# Patient Record
Sex: FEMALE | Race: WHITE
[De-identification: names, ages, dates, MRNs, and addresses within clinical notes are randomized per-mention and may not be internally consistent; named-entity substitution may affect disease eponyms.]

---

## 2019-07-27 ENCOUNTER — HOSPITAL ENCOUNTER (EMERGENCY)
Dept: HOSPITAL 96 - M.ERS | Age: 44
Discharge: HOME | End: 2019-07-27
Payer: COMMERCIAL

## 2019-07-27 VITALS — SYSTOLIC BLOOD PRESSURE: 96 MMHG | DIASTOLIC BLOOD PRESSURE: 55 MMHG

## 2019-07-27 VITALS — HEIGHT: 62 IN | WEIGHT: 125 LBS | BODY MASS INDEX: 23 KG/M2

## 2019-07-27 DIAGNOSIS — Z88.6: ICD-10-CM

## 2019-07-27 DIAGNOSIS — K57.30: ICD-10-CM

## 2019-07-27 DIAGNOSIS — Z98.890: ICD-10-CM

## 2019-07-27 DIAGNOSIS — Z90.711: ICD-10-CM

## 2019-07-27 DIAGNOSIS — F41.9: ICD-10-CM

## 2019-07-27 DIAGNOSIS — F32.9: ICD-10-CM

## 2019-07-27 DIAGNOSIS — Z90.49: ICD-10-CM

## 2019-07-27 DIAGNOSIS — N39.0: Primary | ICD-10-CM

## 2019-07-27 DIAGNOSIS — G43.909: ICD-10-CM

## 2019-07-27 LAB
ABSOLUTE BASOPHILS: 0 THOU/UL (ref 0–0.2)
ABSOLUTE EOSINOPHILS: 0 THOU/UL (ref 0–0.7)
ABSOLUTE MONOCYTES: 0.5 THOU/UL (ref 0–1.2)
ALBUMIN SERPL-MCNC: 4.6 G/DL (ref 3.4–5)
ALP SERPL-CCNC: 93 U/L (ref 46–116)
ALT SERPL-CCNC: 23 U/L (ref 30–65)
ANION GAP SERPL CALC-SCNC: 11 MMOL/L (ref 7–16)
AST SERPL-CCNC: 18 U/L (ref 15–37)
BACTERIA-REFLEX: (no result) /HPF
BASOPHILS NFR BLD AUTO: 0.4 %
BILIRUB SERPL-MCNC: 0.4 MG/DL
BILIRUB UR-MCNC: NEGATIVE MG/DL
BUN SERPL-MCNC: 9 MG/DL (ref 7–18)
CALCIUM SERPL-MCNC: 9.7 MG/DL (ref 8.5–10.1)
CHLORIDE SERPL-SCNC: 102 MMOL/L (ref 98–107)
CO2 SERPL-SCNC: 28 MMOL/L (ref 21–32)
COLOR UR: YELLOW
CREAT SERPL-MCNC: 1 MG/DL (ref 0.6–1.3)
EOSINOPHIL NFR BLD: 0.4 %
GLUCOSE SERPL-MCNC: 91 MG/DL (ref 70–99)
GRANULOCYTES NFR BLD MANUAL: 74.9 %
HCT VFR BLD CALC: 42 % (ref 37–47)
HGB BLD-MCNC: 14.1 GM/DL (ref 12–15)
KETONES UR STRIP-MCNC: (no result) MG/DL
LIPASE: 95 U/L (ref 73–393)
LYMPHOCYTES # BLD: 1.3 THOU/UL (ref 0.8–5.3)
LYMPHOCYTES NFR BLD AUTO: 17.8 %
MCH RBC QN AUTO: 28.2 PG (ref 26–34)
MCHC RBC AUTO-ENTMCNC: 33.6 G/DL (ref 28–37)
MCV RBC: 84 FL (ref 80–100)
MONOCYTES NFR BLD: 6.5 %
MPV: 8.8 FL. (ref 7.2–11.1)
MUCUS: (no result) STRN/LPF
NEUTROPHILS # BLD: 5.5 THOU/UL (ref 1.6–8.1)
NUCLEATED RBCS: 0 /100WBC
PLATELET COUNT*: 333 THOU/UL (ref 150–400)
POTASSIUM SERPL-SCNC: 4 MMOL/L (ref 3.5–5.1)
PROT SERPL-MCNC: 8.3 G/DL (ref 6.4–8.2)
PROT UR QL STRIP: NEGATIVE
RBC # BLD AUTO: 5.01 MIL/UL (ref 4.2–5)
RBC # UR STRIP: (no result) /UL
RDW-CV: 14.1 % (ref 10.5–14.5)
SODIUM SERPL-SCNC: 141 MMOL/L (ref 136–145)
SP GR UR STRIP: 1.01 (ref 1–1.03)
SQUAMOUS: (no result) /LPF (ref 0–3)
URINE CLARITY: (no result)
URINE GLUCOSE-RANDOM: NEGATIVE
URINE LEUKOCYTES-REFLEX: (no result)
URINE NITRITE-REFLEX: POSITIVE
URINE WBC-REFLEX: (no result) /HPF (ref 0–5)
UROBILINOGEN UR STRIP-ACNC: 0.2 E.U./DL (ref 0.2–1)
WBC # BLD AUTO: 7.3 THOU/UL (ref 4–11)

## 2020-11-07 ENCOUNTER — HOSPITAL ENCOUNTER (EMERGENCY)
Dept: HOSPITAL 35 - ER | Age: 45
LOS: 1 days | Discharge: HOME | End: 2020-11-08
Payer: COMMERCIAL

## 2020-11-07 VITALS — WEIGHT: 133 LBS | HEIGHT: 62 IN | BODY MASS INDEX: 24.48 KG/M2

## 2020-11-07 DIAGNOSIS — Z88.8: ICD-10-CM

## 2020-11-07 DIAGNOSIS — R11.2: Primary | ICD-10-CM

## 2020-11-07 DIAGNOSIS — Z90.49: ICD-10-CM

## 2020-11-07 DIAGNOSIS — J02.9: ICD-10-CM

## 2020-11-07 DIAGNOSIS — Z20.828: ICD-10-CM

## 2020-11-07 DIAGNOSIS — K21.9: ICD-10-CM

## 2020-11-07 DIAGNOSIS — J06.9: ICD-10-CM

## 2020-11-07 DIAGNOSIS — Z79.899: ICD-10-CM

## 2020-11-07 DIAGNOSIS — G43.909: ICD-10-CM

## 2020-11-07 DIAGNOSIS — R10.11: ICD-10-CM

## 2020-11-08 VITALS — DIASTOLIC BLOOD PRESSURE: 58 MMHG | SYSTOLIC BLOOD PRESSURE: 100 MMHG

## 2020-11-08 LAB
ALBUMIN SERPL-MCNC: 3.8 G/DL (ref 3.4–5)
ALT SERPL-CCNC: 29 U/L (ref 30–65)
ANION GAP SERPL CALC-SCNC: 7 MMOL/L (ref 7–16)
AST SERPL-CCNC: 25 U/L (ref 15–37)
BASOPHILS NFR BLD AUTO: 0.5 % (ref 0–2)
BILIRUB SERPL-MCNC: 0.2 MG/DL (ref 0.2–1)
BUN SERPL-MCNC: 8 MG/DL (ref 7–18)
CALCIUM SERPL-MCNC: 9 MG/DL (ref 8.5–10.1)
CHLORIDE SERPL-SCNC: 104 MMOL/L (ref 98–107)
CO2 SERPL-SCNC: 29 MMOL/L (ref 21–32)
CREAT SERPL-MCNC: 0.9 MG/DL (ref 0.6–1)
EOSINOPHIL NFR BLD: 2.2 % (ref 0–3)
ERYTHROCYTE [DISTWIDTH] IN BLOOD BY AUTOMATED COUNT: 14.2 % (ref 10.5–14.5)
GLUCOSE SERPL-MCNC: 104 MG/DL (ref 74–106)
GRANULOCYTES NFR BLD MANUAL: 63 % (ref 36–66)
HCT VFR BLD CALC: 35.7 % (ref 37–47)
HGB BLD-MCNC: 11.9 GM/DL (ref 12–15)
LIPASE: 119 U/L (ref 73–393)
LYMPHOCYTES NFR BLD AUTO: 26.6 % (ref 24–44)
MCH RBC QN AUTO: 28.3 PG (ref 26–34)
MCHC RBC AUTO-ENTMCNC: 33.3 G/DL (ref 28–37)
MCV RBC: 85.2 FL (ref 80–100)
MONOCYTES NFR BLD: 7.7 % (ref 1–8)
NEUTROPHILS # BLD: 4.8 THOU/UL (ref 1.4–8.2)
PLATELET # BLD: 349 THOU/UL (ref 150–400)
POTASSIUM SERPL-SCNC: 3.8 MMOL/L (ref 3.5–5.1)
PROT SERPL-MCNC: 7.2 G/DL (ref 6.4–8.2)
RBC # BLD AUTO: 4.19 MIL/UL (ref 4.2–5)
SODIUM SERPL-SCNC: 140 MMOL/L (ref 136–145)
WBC # BLD AUTO: 7.6 THOU/UL (ref 4–11)

## 2020-11-09 NOTE — EKG
[facility]
Deangel[place]   [address]                     ELECTROCARDIOGRAM REPORT      
_______________________________________________________________________________
 
Name:       DEVAN ANSARI          Room #:                     DEP [facility]#:      [id number]                       Account #:      [id number]  
Admission:  20    Attend Phys:                          
Discharge:  20    Date of Birth:  [birth year]  
                                                          Report #: [id number]
                                                                    [id number]
_______________________________________________________________________________
THIS REPORT FOR:  
 
cc:  [facility] physician unknown
     [facility] physician unknown
     Oz Chow MD [facility]                                        ~
THIS REPORT FOR:   //name//                          
 
                         [facility] ED
                                       
Test Date:    2020               Test Time:    00:15:30
Pat Name:     DEVAN ANSARI           Department:   
Patient ID:   [id number]            Room:          
Gender:                               Technician:   
:          1975               Requested By: Vargas Lew
Order Number: [id number] MD:   Oz Chow
                                 Measurements
Intervals                              Axis          
Rate:         84                       P:            68
MN:           149                      QRS:          55
QRSD:         71                       T:            32
QT:           345                                    
QTc:          408                                    
                           Interpretive Statements
Sinus rhythm
Right ventricular conduction delay
No previous ECG available for comparison
Electronically Signed On 2020 7:42:24 CST by Oz Chow
[email or web address]
 
 
 
 
 
 
 
 
 
 
 
 
 
 
 
 
  <ELECTRONICALLY SIGNED>
   By: Oz Chow MD, [facility]   
  20     0742
D: 1115                           _____________________________________
T: 20                           Oz Chow MD, FAC     /EPI

## 2021-07-15 ENCOUNTER — HOSPITAL ENCOUNTER (EMERGENCY)
Dept: HOSPITAL 96 - M.ERS | Age: 46
Discharge: HOME | End: 2021-07-15
Payer: COMMERCIAL

## 2021-07-15 VITALS — HEIGHT: 62 IN | BODY MASS INDEX: 22.63 KG/M2 | WEIGHT: 123 LBS

## 2021-07-15 VITALS — SYSTOLIC BLOOD PRESSURE: 110 MMHG | DIASTOLIC BLOOD PRESSURE: 70 MMHG

## 2021-07-15 DIAGNOSIS — Z98.890: ICD-10-CM

## 2021-07-15 DIAGNOSIS — Z90.711: ICD-10-CM
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
## 2021-07-15 DIAGNOSIS — G43.909: ICD-10-CM

## 2021-07-15 DIAGNOSIS — Z98.82: ICD-10-CM
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
## 2021-07-15 DIAGNOSIS — Z90.49: ICD-10-CM
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
## 2021-07-15 DIAGNOSIS — S81.011A: Primary | ICD-10-CM
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
## 2021-07-15 DIAGNOSIS — Z88.8: ICD-10-CM
